# Patient Record
Sex: MALE | NOT HISPANIC OR LATINO | URBAN - METROPOLITAN AREA
[De-identification: names, ages, dates, MRNs, and addresses within clinical notes are randomized per-mention and may not be internally consistent; named-entity substitution may affect disease eponyms.]

---

## 2021-08-20 ENCOUNTER — OFFICE VISIT (OUTPATIENT)
Dept: UROLOGY | Facility: CLINIC | Age: 31
End: 2021-08-20

## 2021-08-20 VITALS
WEIGHT: 210 LBS | TEMPERATURE: 97.8 F | HEART RATE: 85 BPM | HEIGHT: 69 IN | BODY MASS INDEX: 31.1 KG/M2 | SYSTOLIC BLOOD PRESSURE: 158 MMHG | DIASTOLIC BLOOD PRESSURE: 95 MMHG

## 2021-08-20 DIAGNOSIS — Z30.2 STERILIZATION: Primary | ICD-10-CM

## 2021-08-20 LAB
BILIRUB BLD-MCNC: NEGATIVE MG/DL
CLARITY, POC: CLEAR
COLOR UR: YELLOW
GLUCOSE UR STRIP-MCNC: ABNORMAL MG/DL
KETONES UR QL: NEGATIVE
LEUKOCYTE EST, POC: NEGATIVE
NITRITE UR-MCNC: NEGATIVE MG/ML
PH UR: 5.5 [PH] (ref 5–8)
PROT UR STRIP-MCNC: NEGATIVE MG/DL
RBC # UR STRIP: ABNORMAL /UL
SP GR UR: 1.02 (ref 1–1.03)
UROBILINOGEN UR QL: NORMAL

## 2021-08-20 PROCEDURE — 81003 URINALYSIS AUTO W/O SCOPE: CPT | Performed by: NURSE PRACTITIONER

## 2021-08-20 PROCEDURE — 99202 OFFICE O/P NEW SF 15 MIN: CPT | Performed by: NURSE PRACTITIONER

## 2021-08-20 NOTE — PROGRESS NOTES
"Chief Complaint  Sterilization (consult )    Subjective          Travon Krishnamurthy 31 y.o. presents to [facility] UROLOGY  History of Present Illness  The patient is self referred, for vasectomy counseling. Prior  surgeries have not been performed.  and has 2 biological children.     The patient is counseled regarding a no scalpel vasectomy. Key points are that it should be considered irreversible even though it can be reversed, there are risks including failure to achieve sterility, recanalization, bleeding, testicular swelling and/or pain, formation of a sperm granuloma and infection. Limitations of activity post-procedure were discussed and he understands that he is not sterile immediately following the procedure. He will need to bring a semen specimen back in about 6-8 weeks to confirm the abscence of sperm and needs to use contraception until then. Patient understands this is considered an irreversible procedure and wishes have performed. Denies any urological problems or bleeding disorders.     Ashwini Morales, APRN  08/20/2021       Review of Systems   Constitutional: Negative for chills and fever.   Respiratory: Negative.    Cardiovascular: Negative.    Gastrointestinal: Negative.    Genitourinary: Negative.    Musculoskeletal: Negative.    Psychiatric/Behavioral: Negative.       Objective   Vital Signs:   /95 (BP Location: Left arm, Patient Position: Sitting, Cuff Size: Adult)   Pulse 85   Temp 97.8 °F (36.6 °C)   Ht 175.3 cm (69\")   Wt 95.3 kg (210 lb)   BMI 31.01 kg/m²     Physical Exam  Vitals and nursing note reviewed.   Constitutional:       General: He is not in acute distress.     Appearance: He is normal weight. He is not ill-appearing.   Cardiovascular:      Rate and Rhythm: Normal rate and regular rhythm.      Heart sounds: Normal heart sounds. No murmur heard.     Pulmonary:      Effort: Pulmonary effort is normal.      Breath sounds: Normal breath sounds. "   Abdominal:      Hernia: There is no hernia in the right inguinal area.   Genitourinary:     Testes:         Right: Swelling present. Mass or tenderness not present.         Left: Mass, tenderness or swelling not present.      Epididymis:      Right: Not inflamed. No tenderness.      Left: Not inflamed. No tenderness.      Comments: Probable right hydrocele. Hernia not palpable  Musculoskeletal:         General: Normal range of motion.   Lymphadenopathy:      Lower Body: No right inguinal adenopathy.   Skin:     General: Skin is warm and dry.   Neurological:      Mental Status: He is alert and oriented to person, place, and time.   Psychiatric:         Mood and Affect: Mood normal.         Behavior: Behavior normal.         Thought Content: Thought content normal.         Judgment: Judgment normal.        Result Review :                 Assessment and Plan    Diagnoses and all orders for this visit:    1. Sterilization (Primary)  -     POC Urinalysis Dipstick, Automated        Follow Up   No follow-ups on file.  Patient was given instructions and counseling regarding his condition or for health maintenance advice. Please see specific information pulled into the AVS if appropriate. Instructions    • The patient is to go immediately home and lie down flat on his back with an ice pack on the scrotum. He may shower in the morning but no immersion in water for 4 days. He is to avoid strenuous activity for 3 days and straddle activity for 3 weeks. He is reminded that he is not yet sterile and must use contraception until we confirm he no longer has sperm in a semen specimen to be brought to the office in 6 weeks. He is to call for problems.  • The patient will schedule a vasectomy if he desires  to proceed.  • Handouts were provided.  • Electronically Identified Patient Education Materials provided.    All risks, benefits and alternatives to vasectomy discussed and understood. Understanding that this is considered an  irreversible procedure. Written consent was obtained. Verbal and written instructions and information were provided. Schedule procedure when patient desires.    RAY Spencer

## 2021-09-09 ENCOUNTER — OFFICE VISIT (OUTPATIENT)
Dept: UROLOGY | Facility: CLINIC | Age: 31
End: 2021-09-09

## 2021-09-09 DIAGNOSIS — Z30.2 STERILIZATION: Primary | ICD-10-CM

## 2021-09-09 PROCEDURE — 55250 REMOVAL OF SPERM DUCT(S): CPT | Performed by: UROLOGY

## 2021-09-09 NOTE — PROGRESS NOTES
Patient was placed in lithotomy position.  Thorough scrubbing her lower abdomen and external genitalia was performed.  Patient was draped in a sterile fashion.  We used 1% Xylocaine with epinephrine and 0.2% ropivacaine equal amounts and injected in the midline of scrotum.  Chinese technique was used and a nick was made in the mid scrotum with the forceps.  Right vas was grasped with a clamp and using cautery it was isolated from the blood vessels and about 2.5 cm of the vas was removed and the edges were coagulated.    Same thing was done on the left side.  Hemostasis was acquired.  4 x 4 dressing was given on the incision and patient was sent home in good condition.  We used about 20 mL of local anesthesia